# Patient Record
(demographics unavailable — no encounter records)

---

## 2025-02-26 NOTE — PHYSICAL EXAM
[de-identified] : Bilateral cerumen. [Midline] : trachea located in midline position [Normal] : no rashes

## 2025-02-26 NOTE — ASSESSMENT
[FreeTextEntry1] : Bailey Suazo presents for follow-up of bilateral aural fullness. She was treated for bilateral otitis externa, now resolved. Bilateral cerumen was removed and symptoms improved. Will use debrox drops for remnant cerumen BID for one more week.   - f/u prn

## 2025-02-26 NOTE — HISTORY OF PRESENT ILLNESS
[de-identified] :  Bailey Suazo is a 79 year old female with prior history of aortic aneurysm requiring repair on baby aspirin presents for initial evaluation of bilateral aural fullness. She states she went to urgent care last week for bilateral aural fullness, left worse than right and was told she had left cerumen impaction and left otitis externa. She states she has intermittent left otalgia. She denies otorrhea. She states she has muffled hearing bilaterally with fullness for the past week. She denies tinnitus or vertigo. She denies recurrent ear infections or fever. [FreeTextEntry1] : 2/26/25 - Bailey Lakeisha presents for follow-up. She used ciprodex drops and debrox drops. She notes improvement in aural fullness. Feels her hearing is returning to baseline. Denies tinnitus, otalgia, otorrhea, or recurrent ear infections. Denies fevers.